# Patient Record
Sex: FEMALE | Race: WHITE | URBAN - METROPOLITAN AREA
[De-identification: names, ages, dates, MRNs, and addresses within clinical notes are randomized per-mention and may not be internally consistent; named-entity substitution may affect disease eponyms.]

---

## 2022-11-11 ENCOUNTER — OFFICE VISIT (OUTPATIENT)
Dept: URBAN - METROPOLITAN AREA CLINIC 22 | Facility: CLINIC | Age: 27
End: 2022-11-11
Payer: MEDICAID

## 2022-11-11 DIAGNOSIS — G80.9 CEREBRAL PALSY: ICD-10-CM

## 2022-11-11 DIAGNOSIS — H53.032 STRABISMIC AMBLYOPIA, LEFT EYE: Primary | ICD-10-CM

## 2022-11-11 PROCEDURE — 92133 CPTRZD OPH DX IMG PST SGM ON: CPT | Performed by: STUDENT IN AN ORGANIZED HEALTH CARE EDUCATION/TRAINING PROGRAM

## 2022-11-11 PROCEDURE — 92004 COMPRE OPH EXAM NEW PT 1/>: CPT | Performed by: STUDENT IN AN ORGANIZED HEALTH CARE EDUCATION/TRAINING PROGRAM

## 2022-11-11 ASSESSMENT — INTRAOCULAR PRESSURE
OD: 14
OS: 16

## 2022-11-11 ASSESSMENT — VISUAL ACUITY
OD: 20/25
OS: 20/40

## 2022-11-11 NOTE — IMPRESSION/PLAN
Impression: Strabismic amblyopia, left eye: H53.032. Plan: Hx of 2 strab sx OS. Monocular precautions; rec'd glasses FT wear.

## 2022-11-11 NOTE — IMPRESSION/PLAN
Impression: Cerebral palsy: G80.9. Plan: Discussed today's findings. OCT RNFL ordered today: thinning OU. Rec'd baseline HVF 30-2. Also discussed with patient to consider vision therapy at clinic such as [facility] to assist w/ gaze tracking. Also discussed possible near task glasses to help w/ accommodative insufficiency.